# Patient Record
Sex: FEMALE | Race: WHITE | URBAN - METROPOLITAN AREA
[De-identification: names, ages, dates, MRNs, and addresses within clinical notes are randomized per-mention and may not be internally consistent; named-entity substitution may affect disease eponyms.]

---

## 2022-10-10 ENCOUNTER — APPOINTMENT (OUTPATIENT)
Dept: URBAN - METROPOLITAN AREA CLINIC 231 | Age: 62
Setting detail: DERMATOLOGY
End: 2022-10-11

## 2022-10-10 DIAGNOSIS — L40.8 OTHER PSORIASIS: ICD-10-CM

## 2022-10-10 PROCEDURE — OTHER PRESCRIPTION: OTHER

## 2022-10-10 PROCEDURE — OTHER MEDICATION COUNSELING: OTHER

## 2022-10-10 PROCEDURE — OTHER COUNSELING: OTHER

## 2022-10-10 PROCEDURE — 99203 OFFICE O/P NEW LOW 30 MIN: CPT

## 2022-10-10 PROCEDURE — OTHER TREATMENT REGIMEN: OTHER

## 2022-10-10 RX ORDER — CLOBETASOL PROPIONATE 0.5 MG/G
OINTMENT TOPICAL
Qty: 60 | Refills: 0 | Status: ERX | COMMUNITY
Start: 2022-10-10

## 2022-10-10 RX ORDER — DESONIDE 0.5 MG/G
OINTMENT TOPICAL
Qty: 60 | Refills: 0 | Status: ERX | COMMUNITY
Start: 2022-10-10

## 2022-10-10 ASSESSMENT — LOCATION DETAILED DESCRIPTION DERM
LOCATION DETAILED: POSTERIOR MID-PARIETAL SCALP
LOCATION DETAILED: RIGHT INGUINAL CREASE
LOCATION DETAILED: LEFT SUPERIOR HELIX

## 2022-10-10 ASSESSMENT — LOCATION ZONE DERM
LOCATION ZONE: TRUNK
LOCATION ZONE: SCALP
LOCATION ZONE: EAR

## 2022-10-10 ASSESSMENT — LOCATION SIMPLE DESCRIPTION DERM
LOCATION SIMPLE: GROIN
LOCATION SIMPLE: LEFT EAR
LOCATION SIMPLE: POSTERIOR SCALP

## 2022-10-10 NOTE — PROCEDURE: TREATMENT REGIMEN
Initiate Treatment: clobetasol 0.05 % topical ointment TP Sig: Apply to aa on scalp and around ears once daily for 1 month then d/c\\nQuantity: 60 g Days Supply: 30\\ndesonide 0.05 % topical ointment TP Sig: Apply to aa on face and groin once daily for 2 weeks then decrease to every other day\\nQuantity: 60 g Days Supply: 30
Plan: Recommended fragrance free products
Detail Level: Zone
Continue Regimen: Ketoconazole (previously prescribed by pcp) use as a face and body wash once daily. May also use on scalp.

## 2022-10-10 NOTE — PROCEDURE: MEDICATION COUNSELING
Xelchacortaz Pregnancy And Lactation Text: This medication is Pregnancy Category D and is not considered safe during pregnancy.  The risk during breast feeding is also uncertain.

## 2022-10-10 NOTE — PROCEDURE: MEDICATION COUNSELING
"""Call if vision decreases or RD warning signs increases/RD warnings given.  No signs of right eye retinal tear Cosentyx Counseling:  I discussed with the patient the risks of Cosentyx including but not limited to worsening of Crohn's disease, immunosuppression, allergic reactions and infections.  The patient understands that monitoring is required including a PPD at baseline and must alert us or the primary physician if symptoms of infection or other concerning signs are noted.